# Patient Record
Sex: FEMALE | Race: WHITE | NOT HISPANIC OR LATINO | Employment: FULL TIME | URBAN - METROPOLITAN AREA
[De-identification: names, ages, dates, MRNs, and addresses within clinical notes are randomized per-mention and may not be internally consistent; named-entity substitution may affect disease eponyms.]

---

## 2020-12-09 ENCOUNTER — TRANSFERRED RECORDS (OUTPATIENT)
Dept: HEALTH INFORMATION MANAGEMENT | Facility: CLINIC | Age: 50
End: 2020-12-09

## 2021-09-23 NOTE — PROGRESS NOTES
Assessment/Plan:   Crystal is a 51-year-old female here for physical.    Establishing care with new doctor, encounter for  Establishing care today, all past medical history reviewed and updated in EMR.    Routine adult health maintenance  Physical completed today.  Pap smear up-to-date.  Labs as below.  Immunizations up-to-date, declines flu.  Discussed mammogram every 1 to 2 years, patient is due but declines at this time.  Discussed starting colon cancer screening at age 52.    Morbid obesity (H)  BMI 40.11 today.  Discussed healthy diet and regular exercise for weight loss.    Prediabetes  History of prediabetes with A1c 5.7% in 2020, will check fasting glucose and A1c levels today.  Strong family history of diabetes.  - Hemoglobin A1c  - Glucose    Lipid screening  Given age and weight, will check lipid panel today.  - Lipid panel reflex to direct LDL Fasting       I have had an Advance Directives discussion with the patient.  The following high BMI interventions were performed this visit: encouragement to exercise and lifestyle education regarding diet    Follow up: 1 year for physical, sooner as needed    Erin Downs MD  [facility]      Subjective:     Crystal Vera is a 51 year old female who presents for an annual exam.     Answers for HPI/ROS submitted by the patient on 9/24/2021  Frequency of exercise:: 4-5 days/week  Getting at least 3 servings of Calcium per day:: Yes  Diet:: Regular (no restrictions)  Taking medications regularly:: No  Medication side effects:: None  Bi-annual eye exam:: Yes  Dental care twice a year:: Yes  Sleep apnea or symptoms of sleep apnea:: None  Additional concerns today:: No  Duration of exercise:: 30-45 minutes    predDM - 5.8% in 2015 and then looked better, 5.7% 2020  Dad DM in his 40s, grandparents/aunts/uncles all have it  Weight has increased with hormone changes  Went to holistic med, low serotonin    Health Maintenance reviewed:  Lipid Profile:  "yes  Glucose Screen: yes  Colonoscopy: Discussed starting at age 50  Mammogram: discussed due but will consider/ponder timing    Gynecologic History  Patient's last menstrual period was 09/13/2021. still having periods, regular, getting \"horrible\" in regards to PMS and food intolerances  Contraception: none  Last Pap: 2018. Results were: nml with neg HPV - no hx abnormal - repeat 5 yrs    Immunization History   Administered Date(s) Administered     COVID-19,PF,Moderna 02/11/2021, 03/11/2021     TDAP Vaccine (Adacel) 08/26/2013     Td (Adult), Adsorbed 09/22/2004     Immunization status: up to date, declines flu.    Current Outpatient Medications   Medication Sig Dispense Refill     cholecalciferol (VITAMIN D3) 10 mcg (400 units) TABS tablet Take 1 tablet by mouth       lactobacillus rhamnosus, GG, (CULTURELL) capsule Take 1 capsule by mouth daily       loratadine (CLARITIN) 10 MG tablet Take 10 mg by mouth       OSCO HIGH POTENCY B COMPLEX/C OR Take 1 tablet by mouth       History reviewed. No pertinent past medical history.  Past Surgical History:   Procedure Laterality Date     APPENDECTOMY       ELBOW SURGERY Left      ORIF PATELLA DISLOCATION Right      TUBAL LIGATION       WRIST FRACTURE SURGERY Right      Aspirin  Family History   Problem Relation Age of Onset     No Known Problems Mother      Diabetes Father      Mental Illness Sister      Diabetes Maternal Grandmother      Diabetes Maternal Grandfather      Diabetes Paternal Grandmother      Heart Disease Paternal Grandmother      Diabetes Paternal Grandfather      Colon Cancer Paternal Grandfather         s/p surgery and then remission     Social History     Socioeconomic History     Marital status: Single     Spouse name: Not on file     Number of children: Not on file     Years of education: Not on file     Highest education level: Not on file   Occupational History     Not on file   Tobacco Use     Smoking status: Never Smoker     Smokeless tobacco: " "Never Used   Substance and Sexual Activity     Alcohol use: Not Currently     Drug use: Never     Sexual activity: Not on file   Other Topics Concern     Not on file   Social History Narrative     Not on file     Social Determinants of Health     Financial Resource Strain:      Difficulty of Paying Living Expenses:    Food Insecurity:      Worried About Running Out of Food in the Last Year:      Ran Out of Food in the Last Year:    Transportation Needs:      Lack of Transportation (Medical):      Lack of Transportation (Non-Medical):    Physical Activity:      Days of Exercise per Week:      Minutes of Exercise per Session:    Stress:      Feeling of Stress :    Social Connections:      Frequency of Communication with Friends and Family:      Frequency of Social Gatherings with Friends and Family:      Attends Yarsani Services:      Active Member of Clubs or Organizations:      Attends Club or Organization Meetings:      Marital Status:    Intimate Partner Violence:      Fear of Current or Ex-Partner:      Emotionally Abused:      Physically Abused:      Sexually Abused:        Review of Systems negative unless noted    Objective:        Vitals:    09/24/21 1014   BP: 120/80   Pulse: 81   Weight: 106 kg (233 lb 11 oz)   Height: 1.626 m (5' 4\")     Body mass index is 40.11 kg/m .    Physical Exam:  General Appearance: Alert, pleasant, appears stated age, obese  Head: Normocephalic, without obvious abnormality  Eyes: PERRL, conjunctiva/corneas clear, EOM's intact  Ears: Normal TM's and external ear canals, both ears  Neck: Supple,without lymphadenopathy, no thyromegaly or nodules noted  Lungs: Clear to auscultation bilaterally, respirations unlabored, no wheezing or crackles  Heart: Regular rate and rhythm, no murmur   Abdomen: Soft, non-tender, no masses, no organomegaly  Extremities: Extremities with strong and symmetric pulses, no cyanosis or edema  Skin: Skin color, texture normal, no rashes or " lesions  Neurologic: Grossly normal, no focal deficits

## 2021-09-24 ENCOUNTER — OFFICE VISIT (OUTPATIENT)
Dept: FAMILY MEDICINE | Facility: CLINIC | Age: 51
End: 2021-09-24
Payer: COMMERCIAL

## 2021-09-24 VITALS
HEIGHT: 64 IN | WEIGHT: 233.69 LBS | BODY MASS INDEX: 39.9 KG/M2 | DIASTOLIC BLOOD PRESSURE: 80 MMHG | HEART RATE: 81 BPM | SYSTOLIC BLOOD PRESSURE: 120 MMHG

## 2021-09-24 DIAGNOSIS — Z76.89 ESTABLISHING CARE WITH NEW DOCTOR, ENCOUNTER FOR: Primary | ICD-10-CM

## 2021-09-24 DIAGNOSIS — Z00.00 ROUTINE ADULT HEALTH MAINTENANCE: ICD-10-CM

## 2021-09-24 DIAGNOSIS — R73.03 PREDIABETES: ICD-10-CM

## 2021-09-24 DIAGNOSIS — E66.01 MORBID OBESITY (H): ICD-10-CM

## 2021-09-24 DIAGNOSIS — Z13.220 LIPID SCREENING: ICD-10-CM

## 2021-09-24 PROBLEM — Z01.419 WELL WOMAN EXAM WITH ROUTINE GYNECOLOGICAL EXAM: Status: RESOLVED | Noted: 2018-04-13 | Resolved: 2021-09-24

## 2021-09-24 PROBLEM — Z12.4 SCREENING FOR CERVICAL CANCER: Status: ACTIVE | Noted: 2018-04-26

## 2021-09-24 PROBLEM — E66.9 OBESITY (BMI 35.0-39.9 WITHOUT COMORBIDITY): Status: ACTIVE | Noted: 2018-04-13

## 2021-09-24 PROBLEM — E66.9 OBESITY (BMI 35.0-39.9 WITHOUT COMORBIDITY): Status: RESOLVED | Noted: 2018-04-13 | Resolved: 2021-09-24

## 2021-09-24 PROBLEM — Z01.419 WELL WOMAN EXAM WITH ROUTINE GYNECOLOGICAL EXAM: Status: ACTIVE | Noted: 2018-04-13

## 2021-09-24 LAB
CHOLEST SERPL-MCNC: 144 MG/DL
FASTING STATUS PATIENT QL REPORTED: YES
FASTING STATUS PATIENT QL REPORTED: YES
GLUCOSE BLD-MCNC: 91 MG/DL (ref 70–125)
HBA1C MFR BLD: 5.3 % (ref 0–5.6)
HDLC SERPL-MCNC: 64 MG/DL
LDLC SERPL CALC-MCNC: 66 MG/DL
TRIGL SERPL-MCNC: 69 MG/DL

## 2021-09-24 PROCEDURE — 80061 LIPID PANEL: CPT | Performed by: FAMILY MEDICINE

## 2021-09-24 PROCEDURE — 83036 HEMOGLOBIN GLYCOSYLATED A1C: CPT | Performed by: FAMILY MEDICINE

## 2021-09-24 PROCEDURE — 36415 COLL VENOUS BLD VENIPUNCTURE: CPT | Performed by: FAMILY MEDICINE

## 2021-09-24 PROCEDURE — 82947 ASSAY GLUCOSE BLOOD QUANT: CPT | Performed by: FAMILY MEDICINE

## 2021-09-24 PROCEDURE — 99386 PREV VISIT NEW AGE 40-64: CPT | Performed by: FAMILY MEDICINE

## 2021-09-24 RX ORDER — LORATADINE 10 MG/1
10 TABLET ORAL
COMMUNITY
End: 2022-11-25

## 2021-09-24 RX ORDER — LACTOBACILLUS RHAMNOSUS GG 10B CELL
1 CAPSULE ORAL DAILY
COMMUNITY
End: 2023-03-27

## 2021-09-24 ASSESSMENT — MIFFLIN-ST. JEOR: SCORE: 1670

## 2021-10-17 ENCOUNTER — HEALTH MAINTENANCE LETTER (OUTPATIENT)
Age: 51
End: 2021-10-17

## 2022-08-23 ENCOUNTER — APPOINTMENT (OUTPATIENT)
Dept: URBAN - METROPOLITAN AREA CLINIC 260 | Age: 52
Setting detail: DERMATOLOGY
End: 2022-08-24

## 2022-08-23 VITALS — WEIGHT: 235 LBS

## 2022-08-23 DIAGNOSIS — L71.8 OTHER ROSACEA: ICD-10-CM

## 2022-08-23 DIAGNOSIS — I78.8 OTHER DISEASES OF CAPILLARIES: ICD-10-CM

## 2022-08-23 PROCEDURE — 99203 OFFICE O/P NEW LOW 30 MIN: CPT

## 2022-08-23 PROCEDURE — OTHER MIPS QUALITY: OTHER

## 2022-08-23 PROCEDURE — OTHER ADDITIONAL NOTES: OTHER

## 2022-08-23 PROCEDURE — OTHER COUNSELING: OTHER

## 2022-08-23 ASSESSMENT — LOCATION ZONE DERM
LOCATION ZONE: NOSE
LOCATION ZONE: FACE

## 2022-08-23 ASSESSMENT — LOCATION DETAILED DESCRIPTION DERM
LOCATION DETAILED: NASAL DORSUM
LOCATION DETAILED: LEFT INFERIOR CENTRAL MALAR CHEEK

## 2022-08-23 ASSESSMENT — SEVERITY ASSESSMENT OVERALL AMONG ALL PATIENTS: IN YOUR EXPERIENCE, AMONG ALL PATIENTS YOU HAVE SEEN WITH THIS CONDITION, HOW SEVERE IS THIS PATIENT'S CONDITION?: MILD

## 2022-08-23 ASSESSMENT — LOCATION SIMPLE DESCRIPTION DERM
LOCATION SIMPLE: LEFT CHEEK
LOCATION SIMPLE: NOSE

## 2022-08-23 NOTE — PROCEDURE: ADDITIONAL NOTES
Detail Level: Simple
Additional Notes: If redness worsens or papules develop, return to clinic for further treatment.
Render Risk Assessment In Note?: no

## 2022-10-03 ENCOUNTER — HEALTH MAINTENANCE LETTER (OUTPATIENT)
Age: 52
End: 2022-10-03

## 2022-11-23 ASSESSMENT — ENCOUNTER SYMPTOMS
BREAST MASS: 0
HEADACHES: 1
CHILLS: 1
FREQUENCY: 0
MYALGIAS: 0
DYSURIA: 0
PARESTHESIAS: 0
EYE PAIN: 0
NERVOUS/ANXIOUS: 0
JOINT SWELLING: 0
COUGH: 0
DIZZINESS: 1
SORE THROAT: 0
WEAKNESS: 0
NAUSEA: 0
HEMATOCHEZIA: 0
CONSTIPATION: 0
HEARTBURN: 0
SHORTNESS OF BREATH: 0
PALPITATIONS: 0
HEMATURIA: 0
ABDOMINAL PAIN: 0
FEVER: 0
ARTHRALGIAS: 1
DIARRHEA: 0

## 2022-11-25 ENCOUNTER — OFFICE VISIT (OUTPATIENT)
Dept: FAMILY MEDICINE | Facility: CLINIC | Age: 52
End: 2022-11-25
Payer: COMMERCIAL

## 2022-11-25 VITALS
SYSTOLIC BLOOD PRESSURE: 136 MMHG | OXYGEN SATURATION: 98 % | TEMPERATURE: 97.9 F | DIASTOLIC BLOOD PRESSURE: 80 MMHG | HEIGHT: 64 IN | WEIGHT: 236 LBS | BODY MASS INDEX: 40.29 KG/M2 | HEART RATE: 73 BPM

## 2022-11-25 DIAGNOSIS — Z00.00 ANNUAL PHYSICAL EXAM: ICD-10-CM

## 2022-11-25 DIAGNOSIS — Z11.4 SCREENING FOR HIV (HUMAN IMMUNODEFICIENCY VIRUS): ICD-10-CM

## 2022-11-25 DIAGNOSIS — E66.01 MORBID OBESITY (H): Primary | ICD-10-CM

## 2022-11-25 DIAGNOSIS — Z12.11 SCREEN FOR COLON CANCER: ICD-10-CM

## 2022-11-25 DIAGNOSIS — R73.03 PREDIABETES: ICD-10-CM

## 2022-11-25 DIAGNOSIS — Z12.31 VISIT FOR SCREENING MAMMOGRAM: ICD-10-CM

## 2022-11-25 DIAGNOSIS — Z12.4 SCREENING FOR CERVICAL CANCER: ICD-10-CM

## 2022-11-25 DIAGNOSIS — E55.9 VITAMIN D DEFICIENCY: ICD-10-CM

## 2022-11-25 DIAGNOSIS — Z12.4 CERVICAL CANCER SCREENING: ICD-10-CM

## 2022-11-25 DIAGNOSIS — Z11.59 NEED FOR HEPATITIS C SCREENING TEST: ICD-10-CM

## 2022-11-25 LAB
ALBUMIN SERPL BCG-MCNC: 4.2 G/DL (ref 3.5–5.2)
ALP SERPL-CCNC: 89 U/L (ref 35–104)
ALT SERPL W P-5'-P-CCNC: 30 U/L (ref 10–35)
ANION GAP SERPL CALCULATED.3IONS-SCNC: 14 MMOL/L (ref 7–15)
AST SERPL W P-5'-P-CCNC: 32 U/L (ref 10–35)
BILIRUB SERPL-MCNC: 0.9 MG/DL
BUN SERPL-MCNC: 9.7 MG/DL (ref 6–20)
CALCIUM SERPL-MCNC: 9.1 MG/DL (ref 8.6–10)
CHLORIDE SERPL-SCNC: 102 MMOL/L (ref 98–107)
CHOLEST SERPL-MCNC: 153 MG/DL
CREAT SERPL-MCNC: 0.64 MG/DL (ref 0.51–0.95)
DEPRECATED CALCIDIOL+CALCIFEROL SERPL-MC: 41 UG/L (ref 20–75)
DEPRECATED HCO3 PLAS-SCNC: 22 MMOL/L (ref 22–29)
ERYTHROCYTE [DISTWIDTH] IN BLOOD BY AUTOMATED COUNT: 12.5 % (ref 10–15)
GFR SERPL CREATININE-BSD FRML MDRD: >90 ML/MIN/1.73M2
GLUCOSE SERPL-MCNC: 90 MG/DL (ref 70–99)
HBA1C MFR BLD: 5.5 % (ref 0–5.6)
HCT VFR BLD AUTO: 39.9 % (ref 35–47)
HDLC SERPL-MCNC: 67 MG/DL
HGB BLD-MCNC: 13.8 G/DL (ref 11.7–15.7)
LDLC SERPL CALC-MCNC: 69 MG/DL
MCH RBC QN AUTO: 30.3 PG (ref 26.5–33)
MCHC RBC AUTO-ENTMCNC: 34.6 G/DL (ref 31.5–36.5)
MCV RBC AUTO: 88 FL (ref 78–100)
NONHDLC SERPL-MCNC: 86 MG/DL
PLATELET # BLD AUTO: 355 10E3/UL (ref 150–450)
POTASSIUM SERPL-SCNC: 4.3 MMOL/L (ref 3.4–5.3)
PROT SERPL-MCNC: 7.5 G/DL (ref 6.4–8.3)
RBC # BLD AUTO: 4.56 10E6/UL (ref 3.8–5.2)
SODIUM SERPL-SCNC: 138 MMOL/L (ref 136–145)
TRIGL SERPL-MCNC: 84 MG/DL
WBC # BLD AUTO: 8.1 10E3/UL (ref 4–11)

## 2022-11-25 PROCEDURE — 85027 COMPLETE CBC AUTOMATED: CPT | Performed by: STUDENT IN AN ORGANIZED HEALTH CARE EDUCATION/TRAINING PROGRAM

## 2022-11-25 PROCEDURE — 80061 LIPID PANEL: CPT | Performed by: STUDENT IN AN ORGANIZED HEALTH CARE EDUCATION/TRAINING PROGRAM

## 2022-11-25 PROCEDURE — 99396 PREV VISIT EST AGE 40-64: CPT | Mod: 25 | Performed by: STUDENT IN AN ORGANIZED HEALTH CARE EDUCATION/TRAINING PROGRAM

## 2022-11-25 PROCEDURE — 90471 IMMUNIZATION ADMIN: CPT | Performed by: STUDENT IN AN ORGANIZED HEALTH CARE EDUCATION/TRAINING PROGRAM

## 2022-11-25 PROCEDURE — 80053 COMPREHEN METABOLIC PANEL: CPT | Performed by: STUDENT IN AN ORGANIZED HEALTH CARE EDUCATION/TRAINING PROGRAM

## 2022-11-25 PROCEDURE — 90750 HZV VACC RECOMBINANT IM: CPT | Performed by: STUDENT IN AN ORGANIZED HEALTH CARE EDUCATION/TRAINING PROGRAM

## 2022-11-25 PROCEDURE — 83036 HEMOGLOBIN GLYCOSYLATED A1C: CPT | Performed by: STUDENT IN AN ORGANIZED HEALTH CARE EDUCATION/TRAINING PROGRAM

## 2022-11-25 PROCEDURE — 36415 COLL VENOUS BLD VENIPUNCTURE: CPT | Performed by: STUDENT IN AN ORGANIZED HEALTH CARE EDUCATION/TRAINING PROGRAM

## 2022-11-25 PROCEDURE — 82306 VITAMIN D 25 HYDROXY: CPT | Performed by: STUDENT IN AN ORGANIZED HEALTH CARE EDUCATION/TRAINING PROGRAM

## 2022-11-25 RX ORDER — FAMOTIDINE 20 MG/1
TABLET, FILM COATED ORAL
COMMUNITY
Start: 2022-06-20 | End: 2022-11-25

## 2022-11-25 RX ORDER — CETIRIZINE HYDROCHLORIDE 10 MG/1
TABLET ORAL
COMMUNITY
Start: 2022-06-20

## 2022-11-25 RX ORDER — FAMOTIDINE 20 MG/1
20 TABLET, FILM COATED ORAL DAILY
Qty: 30 TABLET | COMMUNITY
Start: 2022-11-25

## 2022-11-25 ASSESSMENT — PAIN SCALES - GENERAL: PAINLEVEL: NO PAIN (0)

## 2022-11-25 NOTE — PROGRESS NOTES
Assessment/ Plan     52-year-old female with past no history of morbid obesity, prediabetes who presents for annual physical exam.    1. Visit for screening mammogram  - MA SCREENING DIGITAL BILAT - Future  (s+30); Future    2. Screen for colon cancer  Patient refused Cologuard and colonoscopy today.    3. Screening for HIV (human immunodeficiency virus)  4. Need for hepatitis C screening test  Patient is confident she was previously screened    5. Cervical cancer screening  Patient due in 2023 per her problem list    7. Prediabetes  8. Morbid obesity (H)  Previously in the prediabetic range.  Last A1c in the normal range.  - Hemoglobin A1c; Future    9. Annual physical exam  - Comprehensive metabolic panel (BMP + Alb, Alk Phos, ALT, AST, Total. Bili, TP); Future  - CBC with platelets; Future  - Lipid panel reflex to direct LDL Fasting; Future    10. Vitamin D deficiency  - Vitamin D Deficiency; Future    Follow-up in: 1 year for physical    Brian Crooks MD    Subjective:     Crystal Vera is a 52 year old female who presents for an annual exam.     Chief Complaint   Patient presents with     Physical     Colonoscopy: never  Dexa:- not in menopause yet  Mammogram: - 3 years ago- do not have records.   Pap smear: 2018- repeat in 5 years    Answers for HPI/ROS submitted by the patient on 11/23/2022  Frequency of exercise:: 2-3 days/week  Getting at least 3 servings of Calcium per day:: Yes  Diet:: Breakfast skipped, Other  Taking medications regularly:: Yes  Medication side effects:: None  Bi-annual eye exam:: Yes  Dental care twice a year:: Yes  Sleep apnea or symptoms of sleep apnea:: None  abdominal pain: No  Blood in stool: No  Blood in urine: No  chest pain: No  chills: Yes  congestion: No  constipation: No  cough: No  diarrhea: No  dizziness: Yes  ear pain: No  eye pain: No  nervous/anxious: No  fever: No  frequency: No  genital sores: No  headaches: Yes  hearing loss: No  heartburn: No  arthralgias:  Yes  joint swelling: No  peripheral edema: No  mood changes: Yes  myalgias: No  nausea: No  dysuria: No  palpitations: No  Skin sensation changes: No  sore throat: No  urgency: No  rash: No  shortness of breath: No  visual disturbance: No  weakness: No  pelvic pain: No  vaginal bleeding: No  vaginal discharge: No  tenderness: Yes  breast mass: No  breast discharge: No  Additional concerns today:: No  Duration of exercise:: 30-45 minutes    Immunization History   Administered Date(s) Administered     COVID-19 Vaccine 12+ (Pfizer) 11/24/2021     COVID-19 Vaccine 18+ (Moderna) 02/11/2021, 03/11/2021     TDAP Vaccine (Adacel) 08/26/2013     Td (Adult), Adsorbed 09/22/2004     Immunization status: due today.     Current Outpatient Medications   Medication Sig Dispense Refill     cetirizine (ZYRTEC) 10 MG tablet        famotidine (PEPCID) 20 MG tablet        progesterone vaginal cream        cholecalciferol (VITAMIN D3) 10 mcg (400 units) TABS tablet Take 1 tablet by mouth       lactobacillus rhamnosus, GG, (CULTURELL) capsule Take 1 capsule by mouth daily       loratadine (CLARITIN) 10 MG tablet Take 10 mg by mouth (Patient not taking: Reported on 11/25/2022)       OSCO HIGH POTENCY B COMPLEX/C OR Take 1 tablet by mouth       No past medical history on file.  Past Surgical History:   Procedure Laterality Date     APPENDECTOMY       ELBOW SURGERY Left      ORIF PATELLA DISLOCATION Right      TUBAL LIGATION       WRIST FRACTURE SURGERY Right      Aspirin  Family History   Problem Relation Age of Onset     No Known Problems Mother      Diabetes Father      Mental Illness Sister      Diabetes Maternal Grandmother      Diabetes Maternal Grandfather      Diabetes Paternal Grandmother      Heart Disease Paternal Grandmother      Diabetes Paternal Grandfather      Colon Cancer Paternal Grandfather         s/p surgery and then remission     Social History     Socioeconomic History     Marital status: Single     Spouse name: Not  "on file     Number of children: Not on file     Years of education: Not on file     Highest education level: Not on file   Occupational History     Not on file   Tobacco Use     Smoking status: Never     Smokeless tobacco: Never   Substance and Sexual Activity     Alcohol use: Not Currently     Drug use: Never     Sexual activity: Not on file   Other Topics Concern     Not on file   Social History Narrative     Not on file     Social Determinants of Health     Financial Resource Strain: Not on file   Food Insecurity: Not on file   Transportation Needs: Not on file   Physical Activity: Not on file   Stress: Not on file   Social Connections: Not on file   Intimate Partner Violence: Not on file   Housing Stability: Not on file       Review of Systems  Complete ROS negative except as noted in the HPI    Objective:      Vitals:    11/25/22 0940   BP: 136/80   BP Location: Right arm   Cuff Size: Adult Large   Pulse: 73   Temp: 97.9  F (36.6  C)   TempSrc: Temporal   SpO2: 98%   Weight: 107 kg (236 lb)   Height: 1.613 m (5' 3.5\")       General appearance: Alert, cooperative, no distress, appears stated age, obese  Head: Normocephalic, atraumatic, without obvious abnormality  EARS: TM's gray dull with structures seen bilaterally  Eyes: Pupils equal round, reactive.  Conjunctiva clear.  Nose: Nares normal, no drainage.  Throat: Lips, mucosa, tongue normal mucosa pink and moist  Neck: Supple, symmetric, trachea midline, no adenopathy.  No thyroid enlargement, tenderness or nodules.    Lungs: Clear to auscultation bilaterally, no wheezing or crackles present.  Respirations unlabored  Heart: Regular rate and rhythm, normal S1 and S2, no murmur, rub or gallop.  Abdomen: Soft, nontender, nondistended.  Bowel sounds active in all 4 quadrants.  No masses or organomegaly.  Extremities: Extremities normal, atraumatic.  No cyanosis or edema.  Skin: Skin color, texture, turgor normal no rashes or lesions on limited skin " exam  Neurologic: CN II through XII intact, normal strength.      Brian Ibrahim MD

## 2022-11-29 NOTE — RESULT ENCOUNTER NOTE
Crystal  Your results from your recent clinic visit show:  Your Vitamin D was normal  Your CMP was normal with normal electrolytes, kidney function, and liver function  Your cholesterol was normal  Your CBC is normal with no anemia or signs of infection seen  Your hemoglobin A1c was normal.  This is a test looking for diabetes and measures your average blood sugars over the last 3 months.    If you have more questions please call the clinic at [phone] or send me a Appdra message    Dr. Brian Crooks

## 2022-12-21 ENCOUNTER — HOSPITAL ENCOUNTER (OUTPATIENT)
Dept: MAMMOGRAPHY | Facility: CLINIC | Age: 52
Discharge: HOME | End: 2022-12-21
Attending: STUDENT IN AN ORGANIZED HEALTH CARE EDUCATION/TRAINING PROGRAM | Admitting: STUDENT IN AN ORGANIZED HEALTH CARE EDUCATION/TRAINING PROGRAM
Payer: COMMERCIAL

## 2022-12-21 DIAGNOSIS — Z12.31 VISIT FOR SCREENING MAMMOGRAM: ICD-10-CM

## 2022-12-21 PROCEDURE — 77067 SCR MAMMO BI INCL CAD: CPT

## 2023-03-27 ENCOUNTER — TELEPHONE (OUTPATIENT)
Dept: FAMILY MEDICINE | Facility: CLINIC | Age: 53
End: 2023-03-27

## 2023-03-27 ENCOUNTER — ALLIED HEALTH/NURSE VISIT (OUTPATIENT)
Dept: FAMILY MEDICINE | Facility: CLINIC | Age: 53
End: 2023-03-27
Payer: COMMERCIAL

## 2023-03-27 DIAGNOSIS — Z23 ENCOUNTER FOR IMMUNIZATION: Primary | ICD-10-CM

## 2023-03-27 PROCEDURE — 90750 HZV VACC RECOMBINANT IM: CPT

## 2023-03-27 PROCEDURE — 99207 PR NO CHARGE NURSE ONLY: CPT

## 2023-03-27 PROCEDURE — 90471 IMMUNIZATION ADMIN: CPT

## 2023-10-26 ENCOUNTER — PATIENT OUTREACH (OUTPATIENT)
Dept: CARE COORDINATION | Facility: CLINIC | Age: 53
End: 2023-10-26
Payer: COMMERCIAL

## 2023-11-09 ENCOUNTER — PATIENT OUTREACH (OUTPATIENT)
Dept: CARE COORDINATION | Facility: CLINIC | Age: 53
End: 2023-11-09
Payer: COMMERCIAL

## 2023-11-21 ENCOUNTER — PATIENT OUTREACH (OUTPATIENT)
Dept: CARE COORDINATION | Facility: CLINIC | Age: 53
End: 2023-11-21
Payer: COMMERCIAL

## 2023-12-19 ENCOUNTER — PATIENT OUTREACH (OUTPATIENT)
Dept: CARE COORDINATION | Facility: CLINIC | Age: 53
End: 2023-12-19
Payer: COMMERCIAL

## 2023-12-30 ENCOUNTER — HEALTH MAINTENANCE LETTER (OUTPATIENT)
Age: 53
End: 2023-12-30

## 2024-05-23 ENCOUNTER — PATIENT OUTREACH (OUTPATIENT)
Dept: CARE COORDINATION | Facility: CLINIC | Age: 54
End: 2024-05-23

## 2024-08-01 ENCOUNTER — OFFICE VISIT (OUTPATIENT)
Dept: FAMILY MEDICINE | Facility: CLINIC | Age: 54
End: 2024-08-01
Payer: COMMERCIAL

## 2024-08-01 ENCOUNTER — ORDERS ONLY (AUTO-RELEASED) (OUTPATIENT)
Dept: FAMILY MEDICINE | Facility: CLINIC | Age: 54
End: 2024-08-01

## 2024-08-01 VITALS
SYSTOLIC BLOOD PRESSURE: 120 MMHG | BODY MASS INDEX: 43.98 KG/M2 | OXYGEN SATURATION: 98 % | TEMPERATURE: 97 F | RESPIRATION RATE: 19 BRPM | DIASTOLIC BLOOD PRESSURE: 80 MMHG | HEIGHT: 62 IN | HEART RATE: 96 BPM | WEIGHT: 239 LBS

## 2024-08-01 DIAGNOSIS — E66.01 MORBID OBESITY (H): ICD-10-CM

## 2024-08-01 DIAGNOSIS — Z12.11 SCREENING FOR COLON CANCER: ICD-10-CM

## 2024-08-01 DIAGNOSIS — R73.03 PREDIABETES: Primary | ICD-10-CM

## 2024-08-01 DIAGNOSIS — Z12.4 SCREENING FOR CERVICAL CANCER: ICD-10-CM

## 2024-08-01 DIAGNOSIS — Z00.00 ANNUAL PHYSICAL EXAM: ICD-10-CM

## 2024-08-01 PROCEDURE — 90715 TDAP VACCINE 7 YRS/> IM: CPT | Performed by: STUDENT IN AN ORGANIZED HEALTH CARE EDUCATION/TRAINING PROGRAM

## 2024-08-01 PROCEDURE — 99396 PREV VISIT EST AGE 40-64: CPT | Mod: 25 | Performed by: STUDENT IN AN ORGANIZED HEALTH CARE EDUCATION/TRAINING PROGRAM

## 2024-08-01 PROCEDURE — 90471 IMMUNIZATION ADMIN: CPT | Performed by: STUDENT IN AN ORGANIZED HEALTH CARE EDUCATION/TRAINING PROGRAM

## 2024-08-01 RX ORDER — MULTIVITAMIN WITH IRON
1 TABLET ORAL DAILY
COMMUNITY

## 2024-08-01 RX ORDER — CETIRIZINE HYDROCHLORIDE 5 MG/1
5 TABLET ORAL DAILY
COMMUNITY

## 2024-08-01 RX ORDER — OMEGA-3-ACID ETHYL ESTERS 900 MG/1
CAPSULE, LIQUID FILLED ORAL
COMMUNITY

## 2024-08-01 SDOH — HEALTH STABILITY: PHYSICAL HEALTH: ON AVERAGE, HOW MANY DAYS PER WEEK DO YOU ENGAGE IN MODERATE TO STRENUOUS EXERCISE (LIKE A BRISK WALK)?: 7 DAYS

## 2024-08-01 ASSESSMENT — PAIN SCALES - GENERAL: PAINLEVEL: NO PAIN (0)

## 2024-08-01 ASSESSMENT — SOCIAL DETERMINANTS OF HEALTH (SDOH): HOW OFTEN DO YOU GET TOGETHER WITH FRIENDS OR RELATIVES?: THREE TIMES A WEEK

## 2024-08-01 NOTE — PROGRESS NOTES
Assessment/ Plan   54-year-old female with past with history of morbid obesity, prediabetes who presents for annual physical exam    1. Prediabetes  Lab Results   Component Value Date    A1C 5.5 11/25/2022    A1C 5.3 09/24/2021     2. Screening for cervical cancer  Pateint refused despite being due    3. Morbid obesity (H)  Nutrition and exercise counseling. We discussed that she would qualify for medication and surgical therapy. She is not interested at this time but will contact me if she Is in the future    4. Annual physical exam  - Comprehensive metabolic panel (BMP + Alb, Alk Phos, ALT, AST, Total. Bili, TP); Future  - CBC with platelets; Future  - Lipid panel reflex to direct LDL Fasting; Future    5. Screening for colon cancer  - COLOGUARD(EXACT SCIENCES); Future    Follow-up in: 1 year for physical    Brian Crooks MD    Counseling  Appropriate preventive services were discussed with this patient, including applicable screening as appropriate for fall prevention, nutrition, physical activity, Tobacco-use cessation, weight loss and cognition    Subjective:     Crystal Vera is a 54 year old female who presents for an annual exam.     Chief Complaint   Patient presents with    Physical       Colonoscopy:  Dexa:  Mammogram:  Pap smear:    Immunization History   Administered Date(s) Administered    COVID-19 MONOVALENT 12+ (Pfizer) 11/24/2021    COVID-19 Monovalent 18+ (Moderna) 02/11/2021, 03/11/2021    TDAP Vaccine (Adacel) 08/26/2013    Td (Adult), Adsorbed 09/22/2004    Zoster recombinant adjuvanted (SHINGRIX) 11/25/2022, 03/27/2023     Immunization status: due today.     Current Outpatient Medications   Medication Sig Dispense Refill    cetirizine (ZYRTEC) 10 MG tablet       cetirizine (ZYRTEC) 5 MG tablet Take 5 mg by mouth daily      famotidine (PEPCID) 20 MG tablet Take 1 tablet (20 mg) by mouth daily 30 tablet     magnesium 250 MG tablet Take 1 tablet by mouth daily      Omega-3-acid Ethyl Esters  (FISH OIL OMEGA-3 FATTY ACID) 320MG/ML oral suspension       progesterone vaginal cream        No past medical history on file.  Past Surgical History:   Procedure Laterality Date    APPENDECTOMY      ELBOW SURGERY Left     ORIF PATELLA DISLOCATION Right     TUBAL LIGATION      WRIST FRACTURE SURGERY Right      Aspirin  Family History   Problem Relation Age of Onset    No Known Problems Mother     Diabetes Father     Mental Illness Sister     Diabetes Maternal Grandmother     Diabetes Maternal Grandfather     Diabetes Paternal Grandmother     Heart Disease Paternal Grandmother     Diabetes Paternal Grandfather     Colon Cancer Paternal Grandfather         s/p surgery and then remission     Social History     Socioeconomic History    Marital status: Single     Spouse name: Not on file    Number of children: Not on file    Years of education: Not on file    Highest education level: Not on file   Occupational History    Not on file   Tobacco Use    Smoking status: Never    Smokeless tobacco: Never   Substance and Sexual Activity    Alcohol use: Not Currently    Drug use: Never    Sexual activity: Yes     Partners: Male   Other Topics Concern    Not on file   Social History Narrative    Not on file     Social Determinants of Health     Financial Resource Strain: Low Risk  (8/1/2024)    Financial Resource Strain     Within the past 12 months, have you or your family members you live with been unable to get utilities (heat, electricity) when it was really needed?: No   Food Insecurity: Low Risk  (8/1/2024)    Food Insecurity     Within the past 12 months, did you worry that your food would run out before you got money to buy more?: No     Within the past 12 months, did the food you bought just not last and you didn t have money to get more?: No   Transportation Needs: Low Risk  (8/1/2024)    Transportation Needs     Within the past 12 months, has lack of transportation kept you from medical appointments, getting your  "medicines, non-medical meetings or appointments, work, or from getting things that you need?: No   Physical Activity: Unknown (8/1/2024)    Exercise Vital Sign     Days of Exercise per Week: 7 days     Minutes of Exercise per Session: Not on file   Stress: No Stress Concern Present (8/1/2024)    [place] [employer] - Occupational Stress Questionnaire     Feeling of Stress : Only a little   Social Connections: Unknown (8/1/2024)    Social Connection and Isolation Panel [NHANES]     Frequency of Communication with Friends and Family: Not on file     Frequency of Social Gatherings with Friends and Family: Three times a week     Attends Mosque Services: Not on file     Active Member of Clubs or Organizations: Not on file     Attends Club or Organization Meetings: Not on file     Marital Status: Not on file   Interpersonal Safety: Not on file   Housing Stability: Low Risk  (8/1/2024)    Housing Stability     Do you have housing? : Yes     Are you worried about losing your housing?: No       Review of Systems  Complete ROS negative except as noted in the HPI    Objective:      Vitals:    08/01/24 1322   BP: 120/80   Pulse: 96   Resp: 19   Temp: 97  F (36.1  C)   SpO2: 98%   Weight: 108.4 kg (239 lb)   Height: 1.575 m (5' 2\")       General appearance: Alert, cooperative, no distress, appears stated age, obese  Head: Normocephalic, atraumatic, without obvious abnormality  EARS: TM's gray dull with structures seen bilaterally  Eyes: Pupils equal round, reactive.  Conjunctiva clear.  Nose: Nares normal, no drainage.  Throat: Lips, mucosa, tongue normal mucosa pink and moist  Neck: Supple, symmetric, trachea midline, no adenopathy.  No thyroid enlargement, tenderness or nodules.    Lungs: Clear to auscultation bilaterally, no wheezing or crackles present.  Respirations unlabored  Heart: Regular rate and rhythm, normal S1 and S2, no murmur, rub or gallop.  Abdomen: Soft, nontender, nondistended.  Bowel " sounds active in all 4 quadrants.  No masses or organomegaly.  Extremities: Extremities normal, atraumatic.  No cyanosis or edema.  Skin: Skin color, texture, turgor normal no rashes or lesions on limited skin exam  Neurologic: CN II through XII intact, normal strength.      Brian Ibrahim MD

## 2024-08-02 ENCOUNTER — LAB (OUTPATIENT)
Dept: LAB | Facility: CLINIC | Age: 54
End: 2024-08-02
Payer: COMMERCIAL

## 2024-08-02 DIAGNOSIS — Z00.00 ANNUAL PHYSICAL EXAM: ICD-10-CM

## 2024-08-02 LAB
ALBUMIN SERPL BCG-MCNC: 4.4 G/DL (ref 3.5–5.2)
ALP SERPL-CCNC: 88 U/L (ref 40–150)
ALT SERPL W P-5'-P-CCNC: 26 U/L (ref 0–50)
ANION GAP SERPL CALCULATED.3IONS-SCNC: 11 MMOL/L (ref 7–15)
AST SERPL W P-5'-P-CCNC: 24 U/L (ref 0–45)
BILIRUB SERPL-MCNC: 1 MG/DL
BUN SERPL-MCNC: 13 MG/DL (ref 6–20)
CALCIUM SERPL-MCNC: 9.5 MG/DL (ref 8.8–10.4)
CHLORIDE SERPL-SCNC: 100 MMOL/L (ref 98–107)
CHOLEST SERPL-MCNC: 150 MG/DL
CREAT SERPL-MCNC: 0.72 MG/DL (ref 0.51–0.95)
EGFRCR SERPLBLD CKD-EPI 2021: >90 ML/MIN/1.73M2
ERYTHROCYTE [DISTWIDTH] IN BLOOD BY AUTOMATED COUNT: 12.5 % (ref 10–15)
FASTING STATUS PATIENT QL REPORTED: YES
FASTING STATUS PATIENT QL REPORTED: YES
GLUCOSE SERPL-MCNC: 93 MG/DL (ref 70–99)
HCO3 SERPL-SCNC: 25 MMOL/L (ref 22–29)
HCT VFR BLD AUTO: 41.8 % (ref 35–47)
HDLC SERPL-MCNC: 67 MG/DL
HGB BLD-MCNC: 14.3 G/DL (ref 11.7–15.7)
LDLC SERPL CALC-MCNC: 66 MG/DL
MCH RBC QN AUTO: 30 PG (ref 26.5–33)
MCHC RBC AUTO-ENTMCNC: 34.2 G/DL (ref 31.5–36.5)
MCV RBC AUTO: 88 FL (ref 78–100)
NONHDLC SERPL-MCNC: 83 MG/DL
PLATELET # BLD AUTO: 324 10E3/UL (ref 150–450)
POTASSIUM SERPL-SCNC: 4.3 MMOL/L (ref 3.4–5.3)
PROT SERPL-MCNC: 7.9 G/DL (ref 6.4–8.3)
RBC # BLD AUTO: 4.76 10E6/UL (ref 3.8–5.2)
SODIUM SERPL-SCNC: 136 MMOL/L (ref 135–145)
TRIGL SERPL-MCNC: 86 MG/DL
WBC # BLD AUTO: 6.5 10E3/UL (ref 4–11)

## 2024-08-02 PROCEDURE — 80061 LIPID PANEL: CPT

## 2024-08-02 PROCEDURE — 36415 COLL VENOUS BLD VENIPUNCTURE: CPT

## 2024-08-02 PROCEDURE — 85027 COMPLETE CBC AUTOMATED: CPT

## 2024-08-02 PROCEDURE — 80053 COMPREHEN METABOLIC PANEL: CPT

## 2024-08-05 NOTE — RESULT ENCOUNTER NOTE
Crystal Vera  Your results from your recent clinic visit show:  Your cholesterol was normal  Your CMP was normal with normal electrolytes, kidney function, and liver function  Your CBC is normal with no anemia or signs of infection seen    If you have more questions please call the clinic at [phone] or send me a Typemock message    Dr. Brian Crooks

## 2024-08-14 LAB — NONINV COLON CA DNA+OCC BLD SCRN STL QL: NEGATIVE

## 2024-10-23 ENCOUNTER — VIRTUAL VISIT (OUTPATIENT)
Dept: URGENT CARE | Facility: CLINIC | Age: 54
End: 2024-10-23
Payer: COMMERCIAL

## 2024-10-23 DIAGNOSIS — N30.01 ACUTE CYSTITIS WITH HEMATURIA: Primary | ICD-10-CM

## 2024-10-23 PROCEDURE — 99203 OFFICE O/P NEW LOW 30 MIN: CPT | Mod: 95

## 2024-10-23 RX ORDER — CEPHALEXIN 500 MG/1
500 CAPSULE ORAL 2 TIMES DAILY
Qty: 14 CAPSULE | Refills: 0 | Status: SHIPPED | OUTPATIENT
Start: 2024-10-23 | End: 2024-10-30

## 2024-10-23 NOTE — PATIENT INSTRUCTIONS
If symptoms do no improve over next 24-48 hours please be seen in person for further evaluation. If symptoms worsen (you develop a fever, severe abdominal pain or back pain) please go to the ER.

## 2024-10-23 NOTE — PROGRESS NOTES
Crystal is a 54 year old female who presents for a billable video visit.    ASSESSMENT/PLAN:  Diagnoses and all orders for this visit:    Acute cystitis with hematuria  -     cephALEXin (KEFLEX) 500 MG capsule; Take 1 capsule (500 mg) by mouth 2 times daily for 7 days.    Patient instructions:  Take antibiotic as prescribed with food. Drink plenty of water. If you develop sever abdominal or lower back pain or fever please go to ED.     Medical decision making:  Pt presents with urgency, frequency and dysuria. Ddx include but not limited to UTI, pyelonephritis, STI, sepsis and others. Symptoms today consistent with UTI. No signs of more systemic disease of pyelonephritis or sepsis. Will treat patient with keflex here today as it worked well for her when she had her UTI about 1.5 years ago.     SUBJECTIVE: Pt reports she woke up this morning with urgency, dysuria and pelvic pressure. No new vaginal discharge, no fever, no back pain. Feels like the UTI she's had in the past.       ROS: Pertinent ROS neg other than the symptoms noted above in the HPI.     OBJECTIVE:  Vitals not done due to this being a virtual visit    GENERAL: healthy, alert and no distress  EYES: Eyes grossly normal to inspection,conjunctivae and sclerae normal  RESP: Able to speak in complete sentences, no audible wheeze or cough  SKIN: no suspicious lesions or rashes  NEURO: mentation intact and speech normal  PSYCH: mentation appears normal, affect normal/bright    Video-Visit Details    Type of service:  Video Visit  Video Start Time: 1115  Video End Time: 1120    Originating Location: Home    Distant Location:  [facility]     Platform used for Video Visit: MISSAEL Giles CNP

## 2024-11-19 ENCOUNTER — MYC MEDICAL ADVICE (OUTPATIENT)
Dept: FAMILY MEDICINE | Facility: CLINIC | Age: 54
End: 2024-11-19

## 2024-11-19 NOTE — LETTER
December 26, 2024      Crystal Vera  [address] [id number] [facility] [id number]            Dear Crystal      In reviewing your records, we have identified a gap in your preventive services. Based on your age and health history, we recommend the following services.     Cervical cancer screening (PAP Smear)     Please call [phone] to schedule a cervical cancer screening (PAP smear)     If you have already completed any of the above services please let me know the date the service was completed and where it was completed so we can request these records for your provider to review.     We believe that a strong preventive care program, including regular physical and follow-up healthcare appointments are an important part of a healthy lifestyle and we are committed to helping you maintain your health.     Thank you for choosing us as your health care provider.     Sincerely,      Maureen Geiger, MANDAN, RN  [facility]

## 2024-11-21 ENCOUNTER — PATIENT OUTREACH (OUTPATIENT)
Dept: CARE COORDINATION | Facility: CLINIC | Age: 54
End: 2024-11-21
Payer: COMMERCIAL

## 2024-12-19 ENCOUNTER — PATIENT OUTREACH (OUTPATIENT)
Dept: CARE COORDINATION | Facility: CLINIC | Age: 54
End: 2024-12-19
Payer: COMMERCIAL

## 2024-12-26 NOTE — TELEPHONE ENCOUNTER
Patient Quality Outreach    Patient is due for the following:   Cervical Cancer Screening - PAP Needed    Action(s) Taken:   Schedule a office visit for PAP    Type of outreach:    Sent [employer] message. and Sent letter.    Questions for provider review:    None           Maureen Geiger RN

## 2025-01-14 ENCOUNTER — HOSPITAL ENCOUNTER (OUTPATIENT)
Dept: MAMMOGRAPHY | Facility: CLINIC | Age: 55
Discharge: HOME | End: 2025-01-14
Attending: STUDENT IN AN ORGANIZED HEALTH CARE EDUCATION/TRAINING PROGRAM
Payer: COMMERCIAL

## 2025-01-14 DIAGNOSIS — Z12.31 VISIT FOR SCREENING MAMMOGRAM: ICD-10-CM

## 2025-01-14 PROCEDURE — 77067 SCR MAMMO BI INCL CAD: CPT

## 2025-01-14 PROCEDURE — 77063 BREAST TOMOSYNTHESIS BI: CPT

## 2025-02-10 ENCOUNTER — E-VISIT (OUTPATIENT)
Dept: FAMILY MEDICINE | Facility: CLINIC | Age: 55
End: 2025-02-10
Payer: COMMERCIAL

## 2025-02-10 DIAGNOSIS — J01.10 ACUTE NON-RECURRENT FRONTAL SINUSITIS: Primary | ICD-10-CM

## 2025-07-21 ENCOUNTER — PATIENT OUTREACH (OUTPATIENT)
Dept: CARE COORDINATION | Facility: CLINIC | Age: 55
End: 2025-07-21
Payer: COMMERCIAL

## 2025-08-04 ENCOUNTER — PATIENT OUTREACH (OUTPATIENT)
Dept: CARE COORDINATION | Facility: CLINIC | Age: 55
End: 2025-08-04
Payer: COMMERCIAL

## 2025-08-12 ENCOUNTER — OFFICE VISIT (OUTPATIENT)
Dept: FAMILY MEDICINE | Facility: CLINIC | Age: 55
End: 2025-08-12
Payer: COMMERCIAL

## 2025-08-12 VITALS
HEART RATE: 82 BPM | OXYGEN SATURATION: 98 % | RESPIRATION RATE: 19 BRPM | DIASTOLIC BLOOD PRESSURE: 75 MMHG | WEIGHT: 228 LBS | BODY MASS INDEX: 41.96 KG/M2 | SYSTOLIC BLOOD PRESSURE: 126 MMHG | HEIGHT: 62 IN | TEMPERATURE: 97.5 F

## 2025-08-12 DIAGNOSIS — Z00.00 ROUTINE GENERAL MEDICAL EXAMINATION AT A HEALTH CARE FACILITY: Primary | ICD-10-CM

## 2025-08-12 DIAGNOSIS — Z12.4 CERVICAL CANCER SCREENING: ICD-10-CM

## 2025-08-12 DIAGNOSIS — E66.01 MORBID OBESITY (H): ICD-10-CM

## 2025-08-12 DIAGNOSIS — Z00.00 HEALTHCARE MAINTENANCE: ICD-10-CM

## 2025-08-12 PROBLEM — R73.03 PREDIABETES: Status: RESOLVED | Noted: 2020-01-15 | Resolved: 2025-08-12

## 2025-08-12 LAB
ALBUMIN SERPL BCG-MCNC: 4.4 G/DL (ref 3.5–5.2)
ALP SERPL-CCNC: 94 U/L (ref 40–150)
ALT SERPL W P-5'-P-CCNC: 30 U/L (ref 0–50)
ANION GAP SERPL CALCULATED.3IONS-SCNC: 12 MMOL/L (ref 7–15)
AST SERPL W P-5'-P-CCNC: 25 U/L (ref 0–45)
BILIRUB SERPL-MCNC: 0.7 MG/DL
BUN SERPL-MCNC: 15.1 MG/DL (ref 6–20)
CALCIUM SERPL-MCNC: 9.5 MG/DL (ref 8.8–10.4)
CHLORIDE SERPL-SCNC: 100 MMOL/L (ref 98–107)
CHOLEST SERPL-MCNC: 151 MG/DL
CREAT SERPL-MCNC: 0.78 MG/DL (ref 0.51–0.95)
EGFRCR SERPLBLD CKD-EPI 2021: 90 ML/MIN/1.73M2
ERYTHROCYTE [DISTWIDTH] IN BLOOD BY AUTOMATED COUNT: 12.8 % (ref 10–15)
FASTING STATUS PATIENT QL REPORTED: YES
FASTING STATUS PATIENT QL REPORTED: YES
GLUCOSE SERPL-MCNC: 97 MG/DL (ref 70–99)
HCO3 SERPL-SCNC: 25 MMOL/L (ref 22–29)
HCT VFR BLD AUTO: 41.3 % (ref 35–47)
HDLC SERPL-MCNC: 66 MG/DL
HGB BLD-MCNC: 14.1 G/DL (ref 11.7–15.7)
LDLC SERPL CALC-MCNC: 69 MG/DL
MCH RBC QN AUTO: 30.2 PG (ref 26.5–33)
MCHC RBC AUTO-ENTMCNC: 34.1 G/DL (ref 31.5–36.5)
MCV RBC AUTO: 88.4 FL (ref 78–100)
NONHDLC SERPL-MCNC: 85 MG/DL
PLATELET # BLD AUTO: 349 10E3/UL (ref 150–450)
POTASSIUM SERPL-SCNC: 4.4 MMOL/L (ref 3.4–5.3)
PROT SERPL-MCNC: 7.8 G/DL (ref 6.4–8.3)
RBC # BLD AUTO: 4.67 10E6/UL (ref 3.8–5.2)
SODIUM SERPL-SCNC: 137 MMOL/L (ref 135–145)
TRIGL SERPL-MCNC: 78 MG/DL
WBC # BLD AUTO: 6.45 10E3/UL (ref 4–11)

## 2025-08-12 PROCEDURE — 1126F AMNT PAIN NOTED NONE PRSNT: CPT | Performed by: STUDENT IN AN ORGANIZED HEALTH CARE EDUCATION/TRAINING PROGRAM

## 2025-08-12 PROCEDURE — 90471 IMMUNIZATION ADMIN: CPT | Performed by: STUDENT IN AN ORGANIZED HEALTH CARE EDUCATION/TRAINING PROGRAM

## 2025-08-12 PROCEDURE — 87624 HPV HI-RISK TYP POOLED RSLT: CPT | Performed by: STUDENT IN AN ORGANIZED HEALTH CARE EDUCATION/TRAINING PROGRAM

## 2025-08-12 PROCEDURE — 3074F SYST BP LT 130 MM HG: CPT | Performed by: STUDENT IN AN ORGANIZED HEALTH CARE EDUCATION/TRAINING PROGRAM

## 2025-08-12 PROCEDURE — 80061 LIPID PANEL: CPT | Performed by: STUDENT IN AN ORGANIZED HEALTH CARE EDUCATION/TRAINING PROGRAM

## 2025-08-12 PROCEDURE — 85027 COMPLETE CBC AUTOMATED: CPT | Performed by: STUDENT IN AN ORGANIZED HEALTH CARE EDUCATION/TRAINING PROGRAM

## 2025-08-12 PROCEDURE — 90677 PCV20 VACCINE IM: CPT | Performed by: STUDENT IN AN ORGANIZED HEALTH CARE EDUCATION/TRAINING PROGRAM

## 2025-08-12 PROCEDURE — 3078F DIAST BP <80 MM HG: CPT | Performed by: STUDENT IN AN ORGANIZED HEALTH CARE EDUCATION/TRAINING PROGRAM

## 2025-08-12 PROCEDURE — 80053 COMPREHEN METABOLIC PANEL: CPT | Performed by: STUDENT IN AN ORGANIZED HEALTH CARE EDUCATION/TRAINING PROGRAM

## 2025-08-12 PROCEDURE — 99396 PREV VISIT EST AGE 40-64: CPT | Mod: 25 | Performed by: STUDENT IN AN ORGANIZED HEALTH CARE EDUCATION/TRAINING PROGRAM

## 2025-08-12 PROCEDURE — 36415 COLL VENOUS BLD VENIPUNCTURE: CPT | Performed by: STUDENT IN AN ORGANIZED HEALTH CARE EDUCATION/TRAINING PROGRAM

## 2025-08-12 RX ORDER — MULTIVITAMIN
1 TABLET ORAL DAILY
COMMUNITY

## 2025-08-12 RX ORDER — NALTREXONE HYDROCHLORIDE 50 MG/1
14 TABLET, FILM COATED ORAL DAILY
COMMUNITY

## 2025-08-12 RX ORDER — BUPROPION HYDROCHLORIDE 300 MG/1
300 TABLET ORAL EVERY MORNING
COMMUNITY

## 2025-08-12 SDOH — HEALTH STABILITY: PHYSICAL HEALTH: ON AVERAGE, HOW MANY DAYS PER WEEK DO YOU ENGAGE IN MODERATE TO STRENUOUS EXERCISE (LIKE A BRISK WALK)?: 5 DAYS

## 2025-08-12 ASSESSMENT — PAIN SCALES - GENERAL: PAINLEVEL_OUTOF10: NO PAIN (0)

## 2025-08-12 ASSESSMENT — SOCIAL DETERMINANTS OF HEALTH (SDOH): HOW OFTEN DO YOU GET TOGETHER WITH FRIENDS OR RELATIVES?: ONCE A WEEK

## 2025-08-13 LAB
HPV HR 12 DNA CVX QL NAA+PROBE: NEGATIVE
HPV16 DNA CVX QL NAA+PROBE: NEGATIVE
HPV18 DNA CVX QL NAA+PROBE: NEGATIVE
HUMAN PAPILLOMA VIRUS FINAL DIAGNOSIS: NORMAL

## 2025-08-18 LAB
BKR AP ASSOCIATED HPV REPORT: NORMAL
BKR LAB AP GYN ADEQUACY: NORMAL
BKR LAB AP GYN INTERPRETATION: NORMAL
BKR LAB AP GYN OTHER FINDINGS: NORMAL
BKR LAB AP LMP: NORMAL
BKR LAB AP PREVIOUS ABNORMAL: NORMAL
PATH REPORT.COMMENTS IMP SPEC: NORMAL
PATH REPORT.COMMENTS IMP SPEC: NORMAL
PATH REPORT.RELEVANT HX SPEC: NORMAL